# Patient Record
Sex: FEMALE | Race: WHITE | NOT HISPANIC OR LATINO | URBAN - METROPOLITAN AREA
[De-identification: names, ages, dates, MRNs, and addresses within clinical notes are randomized per-mention and may not be internally consistent; named-entity substitution may affect disease eponyms.]

---

## 2023-06-20 ENCOUNTER — TELEPHONE ENCOUNTER (OUTPATIENT)
Dept: URBAN - METROPOLITAN AREA CLINIC 64 | Facility: CLINIC | Age: 48
End: 2023-06-20

## 2023-07-02 ENCOUNTER — WEB ENCOUNTER (OUTPATIENT)
Dept: URBAN - METROPOLITAN AREA CLINIC 63 | Facility: CLINIC | Age: 48
End: 2023-07-02

## 2023-07-06 ENCOUNTER — LAB OUTSIDE AN ENCOUNTER (OUTPATIENT)
Dept: URBAN - METROPOLITAN AREA CLINIC 63 | Facility: CLINIC | Age: 48
End: 2023-07-06

## 2023-07-06 ENCOUNTER — OFFICE VISIT (OUTPATIENT)
Dept: URBAN - METROPOLITAN AREA CLINIC 63 | Facility: CLINIC | Age: 48
End: 2023-07-06
Payer: COMMERCIAL

## 2023-07-06 VITALS
BODY MASS INDEX: 43.52 KG/M2 | DIASTOLIC BLOOD PRESSURE: 80 MMHG | HEART RATE: 75 BPM | TEMPERATURE: 97.1 F | WEIGHT: 261.2 LBS | SYSTOLIC BLOOD PRESSURE: 122 MMHG | OXYGEN SATURATION: 96 % | HEIGHT: 65 IN

## 2023-07-06 DIAGNOSIS — R19.7 DIARRHEA, UNSPECIFIED TYPE: ICD-10-CM

## 2023-07-06 DIAGNOSIS — Z12.11 SCREENING FOR MALIGNANT NEOPLASM OF COLON: ICD-10-CM

## 2023-07-06 PROBLEM — 235595009: Status: ACTIVE | Noted: 2023-07-06

## 2023-07-06 PROCEDURE — 99204 OFFICE O/P NEW MOD 45 MIN: CPT | Performed by: PHYSICIAN ASSISTANT

## 2023-07-06 RX ORDER — LORATADINE 10 MG/1
1 TABLET TABLET ORAL ONCE A DAY
Qty: 30 | Status: ACTIVE | COMMUNITY
Start: 2023-07-06 | End: 2023-08-05

## 2023-07-06 RX ORDER — ASCORBIC ACID 1000 MG
AS DIRECTED TABLET ORAL
Status: ACTIVE | COMMUNITY
Start: 2023-07-06

## 2023-07-06 RX ORDER — PNV NO.95/FERROUS FUM/FOLIC AC 28MG-0.8MG
AS DIRECTED TABLET ORAL
Status: ACTIVE | COMMUNITY

## 2023-07-06 RX ORDER — AMLODIPINE BESYLATE 5 MG/1
1 TABLET TABLET ORAL ONCE A DAY
Qty: 30 | Status: ACTIVE | COMMUNITY
Start: 2023-07-06

## 2023-07-06 RX ORDER — FAMOTIDINE 20 MG/1
1 TABLET AT BEDTIME AS NEEDED TABLET, FILM COATED ORAL ONCE A DAY
Qty: 30 | Status: ACTIVE | COMMUNITY
Start: 2023-07-06

## 2023-07-06 NOTE — HPI-TODAY'S VISIT:
48-year-old female with history of heart murmur, hypertension, hyperlipidemia, obesity presents to the office for evaluation of GERD symptoms and diarrhea. She was seen in the ER on June 27, 2023 with chief complaint of generalized abdominal pain over 5 days.  She reported associated diarrhea, nausea, and fatigue.  She used Imodium with minimal relief.  Labs were done including CBC which demonstrated WBC 12.1, hemoglobin 14.4.  CMP and lipase were unremarkable.  CT scan of the abdomen and pelvis was done with contrast which demonstrated heterogenous appearance of the left ovary, possibly related to underlying hemorrhagic cyst.  No other abnormality was noted.  Right upper quadrant ultrasound showed hepatomegaly.  The liver measures 20.  3 cm.  Normal echogenicity.  The gallbladder appeared normal with no stones.  CBD measured 4 mm.  Pelvic ultrasound demonstrated a normal-appearing left ovary with a dominant follicle.  She was prescribed Bactrim for possible UTI and was given Zofran to use as needed for nausea.  Her urine culture grew normal cl.  She reports having diarrhea intermittently over the past couple of weeks. States she has had more diarrhea than usual. She has not had a solid BM since 6/26/23. States she usually would have diarrhea for 2 days once a month during her menses. She is currently having 1-2 episode of diarrhea per day. Denies any blood in the stool. She had some abdominal discomfort when seen in the ER but no complaints of any abdominal pain today. Denies unintended weigth loss. Denies sick contacts, new medications. No prior colonoscopy. She has been eating a low fiber diet.   She has a family history of colon cancer in her paternal grandfather. Her father had colon polyps.

## 2023-09-08 ENCOUNTER — OFFICE VISIT (OUTPATIENT)
Dept: URBAN - METROPOLITAN AREA MEDICAL CENTER 14 | Facility: MEDICAL CENTER | Age: 48
End: 2023-09-08
Payer: COMMERCIAL

## 2023-09-08 DIAGNOSIS — Z12.11 COLON CANCER SCREENING: ICD-10-CM

## 2023-09-08 DIAGNOSIS — K64.0 GRADE I HEMORRHOIDS: ICD-10-CM

## 2023-09-08 DIAGNOSIS — R19.7 DIARRHEA, UNSPECIFIED TYPE: ICD-10-CM

## 2023-09-08 DIAGNOSIS — D12.0 BENIGN NEOPLASM OF CECUM: ICD-10-CM

## 2023-09-08 PROCEDURE — 45380 COLONOSCOPY AND BIOPSY: CPT | Performed by: INTERNAL MEDICINE

## 2023-09-27 ENCOUNTER — CLAIMS CREATED FROM THE CLAIM WINDOW (OUTPATIENT)
Dept: URBAN - METROPOLITAN AREA CLINIC 63 | Facility: CLINIC | Age: 48
End: 2023-09-27
Payer: COMMERCIAL

## 2023-09-27 ENCOUNTER — DASHBOARD ENCOUNTERS (OUTPATIENT)
Age: 48
End: 2023-09-27

## 2023-09-27 VITALS
WEIGHT: 262 LBS | TEMPERATURE: 96.6 F | DIASTOLIC BLOOD PRESSURE: 80 MMHG | HEIGHT: 65 IN | SYSTOLIC BLOOD PRESSURE: 130 MMHG | OXYGEN SATURATION: 97 % | BODY MASS INDEX: 43.65 KG/M2 | HEART RATE: 73 BPM

## 2023-09-27 DIAGNOSIS — R19.7 DIARRHEA, UNSPECIFIED TYPE: ICD-10-CM

## 2023-09-27 DIAGNOSIS — K63.5 POLYP OF COLON, UNSPECIFIED PART OF COLON, UNSPECIFIED TYPE: ICD-10-CM

## 2023-09-27 PROCEDURE — 99213 OFFICE O/P EST LOW 20 MIN: CPT | Performed by: INTERNAL MEDICINE

## 2023-09-27 RX ORDER — PNV NO.95/FERROUS FUM/FOLIC AC 28MG-0.8MG
AS DIRECTED TABLET ORAL
Status: ACTIVE | COMMUNITY

## 2023-09-27 RX ORDER — ASCORBIC ACID 1000 MG
AS DIRECTED TABLET ORAL
Status: ACTIVE | COMMUNITY
Start: 2023-07-06

## 2023-09-27 RX ORDER — FAMOTIDINE 20 MG/1
1 TABLET AT BEDTIME AS NEEDED TABLET, FILM COATED ORAL ONCE A DAY
Qty: 30 | Status: ACTIVE | COMMUNITY
Start: 2023-07-06

## 2023-09-27 RX ORDER — AMLODIPINE BESYLATE 5 MG/1
1 TABLET TABLET ORAL ONCE A DAY
Qty: 30 | Status: ACTIVE | COMMUNITY
Start: 2023-07-06

## 2023-09-27 NOTE — HPI-TODAY'S VISIT:
Deonna is a 48-year-old female presents today for follow-up after recent colonoscopy. She is a nurse at [employer]. She underwent colonoscopy 09/08/2023.  Colonoscopy revealed colon polyps and hemorrhoids.  Random colon biopsies negative for microscopic colitis. On average, she is moving her bowels 2-3 times daily. Denies diarrhea. She says that the stools are softer. Denies abdominal pain. Denies blood in the stool, melena, hematochezia. Denies unintentional weight loss. Denies nausea or vomitinig. She has started Levothyroxine for treatment of hypothyroidism. She is taking Probiotic. She is also taking Famoitidine for GERD. Labs 09/19/2023:WBC 11.9, hemoglobin 13.2, hematocrit 43.2, platelet 394, sodium 137, potassium 4.1, creatinine 0.61, albumin 4.2, alkaline phosphatase 84, AST 19, ALT 24, bilirubin 0.4, TSH 0.84 Stool studies 07/14/2023:C. difficile negative, calprotectin 85.

## 2023-12-07 ENCOUNTER — OFFICE VISIT (OUTPATIENT)
Dept: URBAN - METROPOLITAN AREA CLINIC 63 | Facility: CLINIC | Age: 48
End: 2023-12-07